# Patient Record
Sex: MALE | Race: WHITE | NOT HISPANIC OR LATINO | URBAN - METROPOLITAN AREA
[De-identification: names, ages, dates, MRNs, and addresses within clinical notes are randomized per-mention and may not be internally consistent; named-entity substitution may affect disease eponyms.]

---

## 2018-01-24 ENCOUNTER — TELEPHONE (OUTPATIENT)
Dept: NEUROLOGY | Facility: CLINIC | Age: 73
End: 2018-01-24

## 2018-01-24 NOTE — TELEPHONE ENCOUNTER
----- Message from Jose Alfredo Gray sent at 1/24/2018 11:26 AM CST -----  Contact: [facility] [phone]  [facility] is calling for refill on bd ultra-fine pen needle. Pls call

## 2019-05-20 ENCOUNTER — OFFICE VISIT (OUTPATIENT)
Dept: URGENT CARE | Facility: CLINIC | Age: 74
End: 2019-05-20
Payer: MEDICARE

## 2019-05-20 VITALS
HEIGHT: 71 IN | TEMPERATURE: 97 F | SYSTOLIC BLOOD PRESSURE: 180 MMHG | BODY MASS INDEX: 26.6 KG/M2 | HEART RATE: 55 BPM | RESPIRATION RATE: 18 BRPM | DIASTOLIC BLOOD PRESSURE: 82 MMHG | OXYGEN SATURATION: 97 % | WEIGHT: 190 LBS

## 2019-05-20 DIAGNOSIS — I10 HYPERTENSION, UNSPECIFIED TYPE: ICD-10-CM

## 2019-05-20 DIAGNOSIS — M25.511 ACUTE PAIN OF RIGHT SHOULDER: Primary | ICD-10-CM

## 2019-05-20 PROCEDURE — 73030 XR SHOULDER COMPLETE 2 OR MORE VIEWS RIGHT: ICD-10-PCS | Mod: FY,RT,S$GLB, | Performed by: RADIOLOGY

## 2019-05-20 PROCEDURE — 3079F PR MOST RECENT DIASTOLIC BLOOD PRESSURE 80-89 MM HG: ICD-10-PCS | Mod: CPTII,S$GLB,, | Performed by: FAMILY MEDICINE

## 2019-05-20 PROCEDURE — 3077F SYST BP >= 140 MM HG: CPT | Mod: CPTII,S$GLB,, | Performed by: FAMILY MEDICINE

## 2019-05-20 PROCEDURE — 99203 OFFICE O/P NEW LOW 30 MIN: CPT | Mod: S$GLB,,, | Performed by: FAMILY MEDICINE

## 2019-05-20 PROCEDURE — 3077F PR MOST RECENT SYSTOLIC BLOOD PRESSURE >= 140 MM HG: ICD-10-PCS | Mod: CPTII,S$GLB,, | Performed by: FAMILY MEDICINE

## 2019-05-20 PROCEDURE — 3079F DIAST BP 80-89 MM HG: CPT | Mod: CPTII,S$GLB,, | Performed by: FAMILY MEDICINE

## 2019-05-20 PROCEDURE — 73030 X-RAY EXAM OF SHOULDER: CPT | Mod: FY,RT,S$GLB, | Performed by: RADIOLOGY

## 2019-05-20 PROCEDURE — 99203 PR OFFICE/OUTPT VISIT, NEW, LEVL III, 30-44 MIN: ICD-10-PCS | Mod: S$GLB,,, | Performed by: FAMILY MEDICINE

## 2019-05-20 RX ORDER — AMLODIPINE BESYLATE 2.5 MG/1
2.5 TABLET ORAL DAILY
Qty: 30 TABLET | Refills: 1 | Status: SHIPPED | OUTPATIENT
Start: 2019-05-20

## 2019-05-20 RX ORDER — ATENOLOL 50 MG/1
50 TABLET ORAL DAILY
COMMUNITY

## 2019-05-20 NOTE — PATIENT INSTRUCTIONS
Shoulder Pain with Uncertain Cause  Shoulder pain can have many causes. Pain often comes from the structures that surround the shoulder joint. These are the joint capsule, ligaments, tendons, muscles, and bursa. Pain can also come from cartilage in the joint. Cartilage can become worn out or injured. Its important to know whats causing your pain so the healthcare provider can use the correct treatment. But sometimes its difficult to find the exact cause of shoulder pain. You may need to see a specialist (orthopedist). You may also need special tests such as a CT scan or MRI. The provider may need to use special tools to look inside the joint (arthroscopy).  Shoulder pain can be treated with a sling or a device that keeps your shoulder from moving. You can take an anti-inflammatory medicine such as ibuprofen to ease pain. You may need to do special shoulder exercises. Follow up with a specialist if the pain is severe or doesnt go away after a few weeks.  Home care  Follow these tips when caring for yourself at home:  · If a sling was given to you, leave it in place for the time advised by your healthcare provider. If you arent sure how long to wear it, ask for advice. If the sling becomes loose, adjust it so that your forearm is level with the ground. Your shoulder should feel well supported.  · Put an ice pack on the injured area for 20 minutes every 1 to 2 hours the first day. You can make your own ice pack by putting ice cubes in a plastic bag. Wrap the bag in a thin towel. Continue with ice packs 3 to 4 times a day for the next 2 days. Then use the pack as needed to ease pain and swelling.  · You may use acetaminophen or ibuprofen to control pain, unless another pain medicine was prescribed. If you have chronic liver or kidney disease, talk with your healthcare provider before using these medicines. Also talk with your provider if youve ever had a stomach ulcer or GI bleeding.  · Shoulder pain may seem  worse at night, when there is less to distract you from the pain. If you sleep on your side, try to keep weight off your painful shoulder. Propping pillows behind you may stop you from rolling over onto that shoulder during sleep.   · Shoulder and elbow joints can become stiff if left in a sling for too long. You should start range of motion exercises about 7 to 10 days after the injury. Talk with your provider to find out what type of exercises to do and how soon to start.  · You can take the sling off to shower or bathe.  Follow-up care  Follow up with your healthcare provider if you dont start to get better in the next 5 days.  When to seek medical advice  Call your healthcare provider right away if any of these occur:  · Pain or swelling gets worse or continues for more than a few days  · Your hand or fingers become cold, blue, numb, or tingly  · Large amount of bruising on your shoulder or upper arm  · Difficulty moving your hand or fingers  · Weakness in your hand or fingers  · Your shoulder becomes stiff  · It feels like your shoulder is popping out  · You are less able to do your daily activities  Date Last Reviewed: 10/1/2016  © 5035-2318 [employer]. [address] [place] [address], [place] [address]. All rights reserved. This information is not intended as a substitute for professional medical care. Always follow your healthcare professional's instructions.      BE SURE TO RECHECK WITH YOUR PRIMARY CARE PROVIDER IN ABOUT 2 WEEKS TO FOLLOW UP YOUR BLOOD PRESSURE (SINCE STARTING THIS NEW MEDICATION) AND SHOULDER PAIN.    Make sure that you follow up with your primary care doctor in the next 2-5 days if needed .  Return to the Urgent Care if signs or symptoms change and certainly if you have worsening symptoms go to the nearest emergency department for further evaluation.

## 2019-05-20 NOTE — PROGRESS NOTES
"Subjective:       Patient ID: Jm López is a 73 y.o. male.    Vitals:  height is 5' 10.5" (1.791 m) and weight is 86.2 kg (190 lb). His oral temperature is 97.2 °F (36.2 °C). His blood pressure is 180/82 (abnormal) and his pulse is 55 (abnormal). His respiration is 18 and oxygen saturation is 97%.     Chief Complaint: Fall    Patient sustained a fall yesterday morning coming out of [employer]. He was carrying some lumber and slipped on some leaves. He denies dizziness or lightheadedness. He fell directly on his right shoulder and was able to somewhat break his fall with his hands. He denies hitting his head and had no LOC. He denies use of blood thinners.  Long history of hypertension, with recently elevated readings.  His PCP is out of town.  Elevated BP readings noted recently in Oncology office. S/P right mastectomy for breast cancer.  No headaches.  No chest pain.    Fall   The accident occurred 12 to 24 hours ago. The fall occurred while walking. He landed on concrete. The point of impact was the right shoulder. The pain is present in the right upper arm. The pain is at a severity of 6/10. The pain is moderate. Associated symptoms include numbness and tingling. He has tried nothing for the symptoms.       Constitution: Negative.   HENT: Negative.    Neck: negative.   Cardiovascular: Negative.    Eyes: Negative.    Respiratory: Negative.    Gastrointestinal: Negative.    Endocrine: negative.   Genitourinary: Negative.    Musculoskeletal: Positive for pain and trauma.   Skin: Negative.    Neurological: Positive for numbness.       Objective:      Physical Exam   Constitutional: He is oriented to person, place, and time. He appears well-developed and well-nourished. No distress.   HENT:   Head: Normocephalic and atraumatic.   Right Ear: External ear normal.   Left Ear: External ear normal.   Mouth/Throat: No oropharyngeal exudate.   Eyes: Pupils are equal, round, and reactive to light. EOM are normal.   Neck: " Normal range of motion.   Cardiovascular: Normal rate, regular rhythm and normal heart sounds.   Pulmonary/Chest: Effort normal and breath sounds normal. No stridor. No respiratory distress. He has no wheezes.   Musculoskeletal: He exhibits no edema, tenderness or deformity.   No point tenderness elicited over right shoulder.  Full and painless range of motion noted of right shoulder.  Peripheral pulses present and equal.   Lymphadenopathy:     He has no cervical adenopathy.   Neurological: He is alert and oriented to person, place, and time. He displays normal reflexes. No cranial nerve deficit or sensory deficit. He exhibits normal muscle tone. Coordination normal.   Skin: He is not diaphoretic.       Assessment:       1. Acute pain of right shoulder    2. Hypertension, unspecified type        Plan:         Acute pain of right shoulder  -     XR SHOULDER COMPLETE 2 OR MORE VIEWS RIGHT; Future; Expected date: 05/20/2019    Hypertension, unspecified type  -     amLODIPine (NORVASC) 2.5 MG tablet; Take 1 tablet (2.5 mg total) by mouth once daily.  Dispense: 30 tablet; Refill: 1    BE SURE TO RECHECK WITH YOUR PRIMARY CARE PROVIDER IN ABOUT 2 WEEKS TO FOLLOW UP YOUR BLOOD PRESSURE (SINCE STARTING THIS NEW MEDICATION) AND SHOULDER PAIN.    Make sure that you follow up with your primary care doctor in the next 2-5 days if needed .  Return to the Urgent Care if signs or symptoms change and certainly if you have worsening symptoms go to the nearest emergency department for further evaluation.

## 2020-06-17 ENCOUNTER — LAB VISIT (OUTPATIENT)
Dept: LAB | Facility: OTHER | Age: 75
End: 2020-06-17
Attending: PODIATRIST
Payer: MEDICARE

## 2020-06-17 ENCOUNTER — OFFICE VISIT (OUTPATIENT)
Dept: PODIATRY | Facility: CLINIC | Age: 75
End: 2020-06-17
Payer: MEDICARE

## 2020-06-17 ENCOUNTER — OFFICE VISIT (OUTPATIENT)
Dept: URGENT CARE | Facility: CLINIC | Age: 75
End: 2020-06-17
Payer: MEDICARE

## 2020-06-17 VITALS
TEMPERATURE: 99 F | HEIGHT: 70 IN | OXYGEN SATURATION: 95 % | BODY MASS INDEX: 27.06 KG/M2 | WEIGHT: 189 LBS | RESPIRATION RATE: 18 BRPM | HEART RATE: 62 BPM

## 2020-06-17 VITALS
HEART RATE: 64 BPM | BODY MASS INDEX: 28.72 KG/M2 | SYSTOLIC BLOOD PRESSURE: 190 MMHG | WEIGHT: 200.63 LBS | DIASTOLIC BLOOD PRESSURE: 87 MMHG | HEIGHT: 70 IN

## 2020-06-17 DIAGNOSIS — S91.301A OPEN WOUND OF RIGHT FOOT, INITIAL ENCOUNTER: Primary | ICD-10-CM

## 2020-06-17 DIAGNOSIS — M10.00 ACUTE IDIOPATHIC GOUT, UNSPECIFIED SITE: ICD-10-CM

## 2020-06-17 DIAGNOSIS — M79.671 FOOT PAIN, RIGHT: Primary | ICD-10-CM

## 2020-06-17 DIAGNOSIS — M79.671 FOOT PAIN, RIGHT: ICD-10-CM

## 2020-06-17 LAB
ANION GAP SERPL CALC-SCNC: 7 MMOL/L (ref 8–16)
BASOPHILS # BLD AUTO: 0.04 K/UL (ref 0–0.2)
BASOPHILS NFR BLD: 0.5 % (ref 0–1.9)
BUN SERPL-MCNC: 21 MG/DL (ref 8–23)
CALCIUM SERPL-MCNC: 9.3 MG/DL (ref 8.7–10.5)
CHLORIDE SERPL-SCNC: 110 MMOL/L (ref 95–110)
CO2 SERPL-SCNC: 23 MMOL/L (ref 23–29)
CREAT SERPL-MCNC: 1.4 MG/DL (ref 0.5–1.4)
CRP SERPL-MCNC: 0.6 MG/L (ref 0–8.2)
DIFFERENTIAL METHOD: ABNORMAL
EOSINOPHIL # BLD AUTO: 0.3 K/UL (ref 0–0.5)
EOSINOPHIL NFR BLD: 3.9 % (ref 0–8)
ERYTHROCYTE [DISTWIDTH] IN BLOOD BY AUTOMATED COUNT: 13.7 % (ref 11.5–14.5)
ERYTHROCYTE [SEDIMENTATION RATE] IN BLOOD: 4 MM/HR (ref 0–10)
EST. GFR  (AFRICAN AMERICAN): 57 ML/MIN/1.73 M^2
EST. GFR  (NON AFRICAN AMERICAN): 49 ML/MIN/1.73 M^2
GLUCOSE SERPL-MCNC: 184 MG/DL (ref 70–110)
HCT VFR BLD AUTO: 41.5 % (ref 40–54)
HGB BLD-MCNC: 13.3 G/DL (ref 14–18)
IMM GRANULOCYTES # BLD AUTO: 0.02 K/UL (ref 0–0.04)
IMM GRANULOCYTES NFR BLD AUTO: 0.3 % (ref 0–0.5)
LYMPHOCYTES # BLD AUTO: 2.2 K/UL (ref 1–4.8)
LYMPHOCYTES NFR BLD: 28.5 % (ref 18–48)
MCH RBC QN AUTO: 28.8 PG (ref 27–31)
MCHC RBC AUTO-ENTMCNC: 32 G/DL (ref 32–36)
MCV RBC AUTO: 90 FL (ref 82–98)
MONOCYTES # BLD AUTO: 0.7 K/UL (ref 0.3–1)
MONOCYTES NFR BLD: 9.3 % (ref 4–15)
NEUTROPHILS # BLD AUTO: 4.4 K/UL (ref 1.8–7.7)
NEUTROPHILS NFR BLD: 57.5 % (ref 38–73)
NRBC BLD-RTO: 0 /100 WBC
PLATELET # BLD AUTO: 174 K/UL (ref 150–350)
PMV BLD AUTO: 10.4 FL (ref 9.2–12.9)
POTASSIUM SERPL-SCNC: 4.3 MMOL/L (ref 3.5–5.1)
RBC # BLD AUTO: 4.62 M/UL (ref 4.6–6.2)
SODIUM SERPL-SCNC: 140 MMOL/L (ref 136–145)
URATE SERPL-MCNC: 5 MG/DL (ref 3.4–7)
WBC # BLD AUTO: 7.65 K/UL (ref 3.9–12.7)

## 2020-06-17 PROCEDURE — 99204 PR OFFICE/OUTPT VISIT, NEW, LEVL IV, 45-59 MIN: ICD-10-PCS | Mod: S$GLB,,, | Performed by: NURSE PRACTITIONER

## 2020-06-17 PROCEDURE — 36415 COLL VENOUS BLD VENIPUNCTURE: CPT

## 2020-06-17 PROCEDURE — 99203 OFFICE O/P NEW LOW 30 MIN: CPT | Mod: S$GLB,,, | Performed by: PODIATRIST

## 2020-06-17 PROCEDURE — 99204 OFFICE O/P NEW MOD 45 MIN: CPT | Mod: S$GLB,,, | Performed by: NURSE PRACTITIONER

## 2020-06-17 PROCEDURE — 1101F PR PT FALLS ASSESS DOC 0-1 FALLS W/OUT INJ PAST YR: ICD-10-PCS | Mod: CPTII,S$GLB,, | Performed by: PODIATRIST

## 2020-06-17 PROCEDURE — 85025 COMPLETE CBC W/AUTO DIFF WBC: CPT

## 2020-06-17 PROCEDURE — 99203 PR OFFICE/OUTPT VISIT, NEW, LEVL III, 30-44 MIN: ICD-10-PCS | Mod: S$GLB,,, | Performed by: PODIATRIST

## 2020-06-17 PROCEDURE — 99999 PR PBB SHADOW E&M-EST. PATIENT-LVL III: CPT | Mod: PBBFAC,,, | Performed by: PODIATRIST

## 2020-06-17 PROCEDURE — 99999 PR PBB SHADOW E&M-EST. PATIENT-LVL III: ICD-10-PCS | Mod: PBBFAC,,, | Performed by: PODIATRIST

## 2020-06-17 PROCEDURE — 80048 BASIC METABOLIC PNL TOTAL CA: CPT

## 2020-06-17 PROCEDURE — 86140 C-REACTIVE PROTEIN: CPT

## 2020-06-17 PROCEDURE — 84550 ASSAY OF BLOOD/URIC ACID: CPT

## 2020-06-17 PROCEDURE — 85651 RBC SED RATE NONAUTOMATED: CPT

## 2020-06-17 PROCEDURE — 1159F MED LIST DOCD IN RCRD: CPT | Mod: S$GLB,,, | Performed by: PODIATRIST

## 2020-06-17 PROCEDURE — 3077F SYST BP >= 140 MM HG: CPT | Mod: CPTII,S$GLB,, | Performed by: PODIATRIST

## 2020-06-17 PROCEDURE — 1101F PT FALLS ASSESS-DOCD LE1/YR: CPT | Mod: CPTII,S$GLB,, | Performed by: PODIATRIST

## 2020-06-17 PROCEDURE — 3079F DIAST BP 80-89 MM HG: CPT | Mod: CPTII,S$GLB,, | Performed by: PODIATRIST

## 2020-06-17 PROCEDURE — 3079F PR MOST RECENT DIASTOLIC BLOOD PRESSURE 80-89 MM HG: ICD-10-PCS | Mod: CPTII,S$GLB,, | Performed by: PODIATRIST

## 2020-06-17 PROCEDURE — 1159F PR MEDICATION LIST DOCUMENTED IN MEDICAL RECORD: ICD-10-PCS | Mod: S$GLB,,, | Performed by: PODIATRIST

## 2020-06-17 PROCEDURE — 1126F PR PAIN SEVERITY QUANTIFIED, NO PAIN PRESENT: ICD-10-PCS | Mod: S$GLB,,, | Performed by: PODIATRIST

## 2020-06-17 PROCEDURE — 1126F AMNT PAIN NOTED NONE PRSNT: CPT | Mod: S$GLB,,, | Performed by: PODIATRIST

## 2020-06-17 PROCEDURE — 3077F PR MOST RECENT SYSTOLIC BLOOD PRESSURE >= 140 MM HG: ICD-10-PCS | Mod: CPTII,S$GLB,, | Performed by: PODIATRIST

## 2020-06-17 RX ORDER — COLCHICINE 0.6 MG/1
0.6 TABLET ORAL DAILY
Qty: 15 TABLET | Refills: 0 | Status: SHIPPED | OUTPATIENT
Start: 2020-06-17 | End: 2024-02-17

## 2020-06-17 NOTE — LETTER
June 17, 2020      Lily Gillespie, NP  [address] [facility] [address]           [place] [facility]  [address] [address]  Phone: [phone]  Fax: [phone]          Patient: Jm López   MR Number: [id number]   YOB: 1945   Date of Visit: 6/17/2020       Dear Lily Gillespie:    Thank you for referring Jm López to me for evaluation. Attached you will find relevant portions of my assessment and plan of care.    If you have questions, please do not hesitate to call me. I look forward to following Jm López along with you.    Sincerely,    Dale Small, BEV    Enclosure  CC:  No Recipients    If you would like to receive this communication electronically, please contact [email or web address] or [phone] to request more information on [employer] access.    For providers and/or their staff who would like to refer a patient to [facility], please contact us through our one-stop-shop provider referral line, [facility], at [phone].    If you feel you have received this communication in error or would no longer like to receive these types of communications, please e-mail [email or web address]

## 2020-06-17 NOTE — PATIENT INSTRUCTIONS
General Referral to [facility]  You were referred to [facility] for Follow-up Care.    Please call [phone] to schedule your appointment.    Please go to the Emergency Department for any concerns or worsening of condition.  Please follow up with your primary care doctor or specialist in the next 48-72hrs as needed.    If you  smoke, please stop smoking.

## 2020-06-17 NOTE — PROGRESS NOTES
"Subjective:       Patient ID: Jm López is a 75 y.o. male.    Vitals:  height is 5' 10" (1.778 m) and weight is 85.7 kg (189 lb). His temperature is 99.4 °F (37.4 °C). His pulse is 62. His respiration is 18 and oxygen saturation is 95%.     Chief Complaint: Wound Infection    Patient presents with c.o sore to rt foot. Patient woke up around 1 am. Patient thought he was bitten by a bug. The area is becoming more red and painful. Patient is a diabetic. No fever.    Wound Check  Treated in ED: no ER treatment. The redness has worsened. The swelling has worsened. The pain has worsened.       Constitution: Negative for chills and fever.   HENT: Negative for facial swelling and sore throat.    Neck: Negative for painful lymph nodes.   Eyes: Negative for eye itching and eyelid swelling.   Respiratory: Negative for cough.    Musculoskeletal: Negative for joint pain and joint swelling.   Skin: Positive for wound. Negative for color change, pale, rash, abrasion, laceration, lesion, skin thickening/induration, puncture wound, erythema, abscess, avulsion and hives.   Allergic/Immunologic: Negative for environmental allergies, immunocompromised state and hives.   Hematologic/Lymphatic: Negative for swollen lymph nodes.       Objective:      Physical Exam   HENT:   Head: Normocephalic and atraumatic.   Eyes: Pupils are equal, round, and reactive to light. Conjunctivae are normal.   Cardiovascular: Normal rate, regular rhythm, normal heart sounds and normal pulses.   Pulmonary/Chest: Effort normal and breath sounds normal.   Abdominal: Normal appearance.   Musculoskeletal:      Right foot: Decreased range of motion. Normal capillary refill. Tenderness and swelling present. No bony tenderness, crepitus, deformity or laceration.        Feet:    Neurological: He is alert.   Skin: not right footerythema  Nursing note and vitals reviewed.            Assessment:       1. Open wound of right foot, initial encounter        Plan:     "     Open wound of right foot, initial encounter  -     Cancel: Ambulatory referral/consult to Wound Clinic  -     Ambulatory referral/consult to Podiatry      Patient Instructions     General Referral to [facility]  You were referred to [facility] for Follow-up Care.    Please call [phone] to schedule your appointment.    Please go to the Emergency Department for any concerns or worsening of condition.  Please follow up with your primary care doctor or specialist in the next 48-72hrs as needed.    If you  smoke, please stop smoking.

## 2020-06-17 NOTE — PROGRESS NOTES
Subjective:      Patient ID: Jm López is a 75 y.o. male.    Chief Complaint: Foot Problem (Rightb foot)    Intense pain, redness, heat, swelling, and small wound right big toe joint.  Rapid onset overnight waking from same about 1am this morning with no change since. aggravated by increased weight bearing, shoe gear, pressure.  No previous medical treatment-  evaluation yielded visit here.  No self treatment.  Relates minor trauma bumping right big toe joint on something yesterday.  Denies surgery right foot.  Relates prior mrsa infection right knee following contusion.    Review of Systems   Constitution: Negative for chills, diaphoresis, fever, malaise/fatigue and night sweats.   Cardiovascular: Negative for claudication, cyanosis, leg swelling and syncope.   Skin: Positive for suspicious lesions. Negative for color change, dry skin, nail changes, rash and unusual hair distribution.   Musculoskeletal: Positive for joint pain and joint swelling. Negative for falls, muscle cramps, muscle weakness and stiffness.   Gastrointestinal: Negative for constipation, diarrhea, nausea and vomiting.   Neurological: Negative for brief paralysis, disturbances in coordination, focal weakness, numbness, paresthesias, sensory change and tremors.           Objective:      Physical Exam  Constitutional:       General: He is not in acute distress.     Appearance: He is well-developed. He is not diaphoretic.   Cardiovascular:      Pulses:           Popliteal pulses are 2+ on the right side and 2+ on the left side.        Dorsalis pedis pulses are 2+ on the right side and 2+ on the left side.        Posterior tibial pulses are 2+ on the right side and 2+ on the left side.      Comments: Capillary refill 3 seconds all toes/distal feet, all toes/both feet warm to touch.      Negative lymphadenopathy bilateral popliteal fossa and tarsal tunnel.      Negavie lower extremity edema bilateral.    Musculoskeletal:      Right ankle: He  exhibits normal range of motion, no swelling, no ecchymosis, no deformity, no laceration and normal pulse. Achilles tendon normal. Achilles tendon exhibits no pain, no defect and normal Powers's test results.      Comments: Intense pain, redness, heat, swelling right big toe joint with touch, palpation, range of motion, and pressure/stance without deformity, loss of function beyond guarding, or local signs acute trauma.    Otherwise, Normal angle, base, station of gait. All ten toes without clubbing, cyanosis, or signs of ischemia.  No pain to palpation bilateral lower extremities.  Range of motion, stability, muscle strength, and muscle tone normal bilateral feet and legs.    Lymphadenopathy:      Lower Body: No right inguinal adenopathy. No left inguinal adenopathy.      Comments: Negative lymphadenopathy bilateral popliteal fossa and tarsal tunnel.    Negative lymphangitic streaking bilateral feet/ankles/legs.   Skin:     General: Skin is warm and dry.      Capillary Refill: Capillary refill takes 2 to 3 seconds.      Coloration: Skin is not pale.      Findings: Erythema (mild right 1st mtpj) present. No abrasion, bruising, burn, ecchymosis, laceration, lesion or rash.      Nails: There is no clubbing.        Comments:    Wound:  Medial right 1st mtpj    Size:    Pre:8x8x<1mm      Base:  Granular, pink with no drainage - nothing to culture.  No pus, tracking, fluctuance, malodor.    Borders:   flat, pink, blanchable skin edges without undermining.    Otherwise, Skin is normal age and health appropriate color, turgor, texture, and temperature bilateral lower extremities without ulceration, hyperpigmentation, discoloration, masses nodules or cords palpated.  No ecchymosis, erythema, edema, or cardinal signs of infection bilateral lower extremities.    Neurological:      Mental Status: He is alert and oriented to person, place, and time.      Sensory: No sensory deficit.      Motor: No tremor, atrophy or abnormal  muscle tone.      Gait: Gait normal.      Deep Tendon Reflexes:      Reflex Scores:       Patellar reflexes are 2+ on the right side and 2+ on the left side.       Achilles reflexes are 2+ on the right side and 2+ on the left side.     Comments: Negative tinel sign to percussion sural, superficial peroneal, deep peroneal, saphenous, and posterior tibial nerves right and left ankles and feet.     Psychiatric:         Behavior: Behavior is cooperative.               Assessment:       Encounter Diagnoses   Name Primary?    Foot pain, right Yes    Acute idiopathic gout, unspecified site          Plan:       Jm was seen today for foot problem.    Diagnoses and all orders for this visit:    Foot pain, right  -     X-Ray Foot Complete Right; Future  -     Basic metabolic panel; Future  -     CBC auto differential; Future  -     C-Reactive Protein; Future  -     Sedimentation rate; Future  -     Uric acid; Future    Acute idiopathic gout, unspecified site  -     X-Ray Foot Complete Right; Future  -     Basic metabolic panel; Future  -     CBC auto differential; Future  -     C-Reactive Protein; Future  -     Sedimentation rate; Future  -     Uric acid; Future    Other orders  -     colchicine (COLCRYS) 0.6 mg tablet; Take 1 tablet (0.6 mg total) by mouth once daily.      I counseled the patient on his conditions, their implications and medical management.        Imaging, labs, colchicine.    Hydrate well 4L water daily    Dispense sx shoe righ - ambulate to tolerance weaning to shoes as symptoms allow.    Inspect feet multiple times daily for signs of occurrence/recurrence ulceration/infection.    Cover superficial would right 1st mtpj with band aid dressing daily changing same daily.  Thin layer triple antibiotic ointment beneath daily.            Follow up in about 2 weeks (around 7/1/2020) for gout f/u.

## 2021-01-10 ENCOUNTER — IMMUNIZATION (OUTPATIENT)
Dept: INTERNAL MEDICINE | Facility: CLINIC | Age: 76
End: 2021-01-10
Payer: MEDICARE

## 2021-01-10 DIAGNOSIS — Z23 NEED FOR VACCINATION: ICD-10-CM

## 2021-01-10 PROCEDURE — 91300 COVID-19, MRNA, LNP-S, PF, 30 MCG/0.3 ML DOSE VACCINE: CPT | Mod: PBBFAC | Performed by: INTERNAL MEDICINE

## 2021-01-31 ENCOUNTER — IMMUNIZATION (OUTPATIENT)
Dept: INTERNAL MEDICINE | Facility: CLINIC | Age: 76
End: 2021-01-31
Payer: MEDICARE

## 2021-01-31 DIAGNOSIS — Z23 NEED FOR VACCINATION: Primary | ICD-10-CM

## 2021-01-31 PROCEDURE — 91300 PR SARS-COV- 2 COVID-19 VACCINE, NO PRSV, 30MCG/0.3ML, IM: CPT | Mod: PBBFAC | Performed by: INTERNAL MEDICINE

## 2021-01-31 PROCEDURE — 0002A PR IMMUNIZ ADMIN, SARS-COV-2 COVID-19 VACC, 30MCG/0.3ML, 2ND DOSE: CPT | Mod: PBBFAC | Performed by: INTERNAL MEDICINE

## 2021-01-31 RX ADMIN — RNA INGREDIENT BNT-162B2 0.3 ML: 0.23 INJECTION, SUSPENSION INTRAMUSCULAR at 09:01

## 2021-03-18 ENCOUNTER — PATIENT MESSAGE (OUTPATIENT)
Dept: RESEARCH | Facility: HOSPITAL | Age: 76
End: 2021-03-18

## 2021-03-26 ENCOUNTER — PATIENT MESSAGE (OUTPATIENT)
Dept: RESEARCH | Facility: HOSPITAL | Age: 76
End: 2021-03-26

## 2024-02-17 ENCOUNTER — OFFICE VISIT (OUTPATIENT)
Dept: URGENT CARE | Facility: CLINIC | Age: 79
End: 2024-02-17
Payer: MEDICARE

## 2024-02-17 VITALS
TEMPERATURE: 98 F | RESPIRATION RATE: 19 BRPM | WEIGHT: 185 LBS | DIASTOLIC BLOOD PRESSURE: 85 MMHG | SYSTOLIC BLOOD PRESSURE: 155 MMHG | OXYGEN SATURATION: 97 % | HEIGHT: 70 IN | BODY MASS INDEX: 26.48 KG/M2 | HEART RATE: 78 BPM
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
## 2024-02-17 DIAGNOSIS — Z23 NEED FOR TDAP VACCINATION: ICD-10-CM

## 2024-02-17 DIAGNOSIS — S61.051A DOG BITE OF RIGHT THUMB, INITIAL ENCOUNTER: Primary | ICD-10-CM

## 2024-02-17 PROBLEM — R53.83 FATIGUE: Status: ACTIVE | Noted: 2022-12-13

## 2024-02-17 PROBLEM — G47.00 INSOMNIA: Status: ACTIVE | Noted: 2022-12-13

## 2024-02-17 PROBLEM — D38.1 NEOPLASM OF UNCERTAIN BEHAVIOR OF TRACHEA, BRONCHUS, AND LUNG: Status: ACTIVE | Noted: 2022-07-28

## 2024-02-17 PROBLEM — C50.919: Status: ACTIVE | Noted: 2023-12-18

## 2024-02-17 PROBLEM — J90 RECURRENT PLEURAL EFFUSION ON RIGHT: Status: ACTIVE | Noted: 2022-08-10

## 2024-02-17 PROBLEM — C50.021: Status: ACTIVE | Noted: 2018-06-19

## 2024-02-17 PROBLEM — N40.0 BENIGN PROSTATIC HYPERPLASIA: Status: ACTIVE | Noted: 2022-06-20

## 2024-02-17 PROBLEM — M54.50 ACUTE MIDLINE LOW BACK PAIN WITHOUT SCIATICA: Status: ACTIVE | Noted: 2022-11-01

## 2024-02-17 PROBLEM — E55.9 VITAMIN D DEFICIENCY: Status: ACTIVE | Noted: 2018-07-16

## 2024-02-17 PROBLEM — R00.2 PALPITATIONS: Status: ACTIVE | Noted: 2022-12-13

## 2024-02-17 PROBLEM — N18.2 CKD STAGE G2/A1, GFR 60-89 AND ALBUMIN CREATININE RATIO <30 MG/G: Chronic | Status: ACTIVE | Noted: 2023-10-29

## 2024-02-17 PROBLEM — C78.2: Status: ACTIVE | Noted: 2023-12-18

## 2024-02-17 PROBLEM — E11.9 DIABETES MELLITUS: Status: ACTIVE | Noted: 2023-06-28

## 2024-02-17 PROCEDURE — 99203 OFFICE O/P NEW LOW 30 MIN: CPT | Mod: 25,S$GLB,, | Performed by: NURSE PRACTITIONER

## 2024-02-17 PROCEDURE — 90715 TDAP VACCINE 7 YRS/> IM: CPT | Mod: S$GLB,,, | Performed by: NURSE PRACTITIONER

## 2024-02-17 PROCEDURE — 90471 IMMUNIZATION ADMIN: CPT | Mod: S$GLB,,, | Performed by: NURSE PRACTITIONER

## 2024-02-17 RX ORDER — EMPAGLIFLOZIN 10 MG/1
10 TABLET, FILM COATED ORAL
COMMUNITY

## 2024-02-17 RX ORDER — MUPIROCIN 20 MG/G
OINTMENT TOPICAL 3 TIMES DAILY PRN
Qty: 22 G | Refills: 0 | Status: SHIPPED | OUTPATIENT
Start: 2024-02-17

## 2024-02-17 RX ORDER — METRONIDAZOLE 500 MG/1
500 TABLET ORAL EVERY 8 HOURS
Qty: 9 TABLET | Refills: 0 | Status: SHIPPED | OUTPATIENT
Start: 2024-02-17 | End: 2024-02-20

## 2024-02-17 RX ORDER — INSULIN DEGLUDEC 200 U/ML
12-15 INJECTION, SOLUTION SUBCUTANEOUS
COMMUNITY
Start: 2023-10-20 | End: 2024-10-19

## 2024-02-17 RX ORDER — DOXYCYCLINE 100 MG/1
100 CAPSULE ORAL EVERY 12 HOURS
Qty: 6 CAPSULE | Refills: 0 | Status: SHIPPED | OUTPATIENT
Start: 2024-02-17 | End: 2024-02-20

## 2024-02-17 NOTE — PATIENT INSTRUCTIONS
If you got a prescription for antibiotics, be sure to take all of the medicine as prescribed.  Use a thin layer of antibiotic ointment to help keep the wound moist. This will also keep the dressing from sticking to the wound.  After a day or two, you can gently wash the wound with soap and water. Pat dry and put on a clean dressing.   Change your dressing three times a day.  Always wash your hands before and after touching the wound.  Each time you change the dressing, look closely at the wound to be sure it is healing the right way. The wound may have a thin, yellowish discharge, and this is normal.  Avoid picking the scab or scratching the site which may cause more irritation.  Do not soak in water or swim with an open wound.  Please return here or go to the Emergency Department for any concerns or worsening of condition.  You were prescribed antibiotics, please take them to completion.  Please follow up with your primary care doctor or specialist as needed.    If you  smoke, please stop smoking.

## 2024-02-17 NOTE — PROGRESS NOTES
"Subjective:      Patient ID: Jm López is a 78 y.o. male.    Vitals:  height is 5' 10" (1.778 m) and weight is 83.9 kg (185 lb). His oral temperature is 97.7 °F (36.5 °C). His blood pressure is 155/85 (abnormal) and his pulse is 78. His respiration is 19 and oxygen saturation is 97%.     Chief Complaint: Animal Bite    Patient presents with dog bite on right thumb. Patient received a rescue dog. Unsure of last tetanus. Onset 1 day      78 year old male presents to clinic with reports of dog bite right thumb one day ago. Does not recall last tetanus vaccine.     Animal Bite   The incident occurred yesterday. The incident occurred at home. There is an injury to the Right thumb. The pain is mild. His tetanus status is unknown.       Constitution: Negative for chills and fever.   Skin:  Positive for laceration and puncture wound. Negative for color change and erythema.      Objective:     Physical Exam   Constitutional: He is oriented to person, place, and time. He appears well-developed.   HENT:   Head: Normocephalic and atraumatic. Head is without abrasion, without contusion and without laceration.   Ears:   Right Ear: External ear normal.   Left Ear: External ear normal.   Nose: Nose normal.   Mouth/Throat: Oropharynx is clear and moist and mucous membranes are normal.   Eyes: EOM and lids are normal.   Neck: Trachea normal and phonation normal. Neck supple.   Cardiovascular: Normal rate.   Pulmonary/Chest: Effort normal. No stridor. No respiratory distress.   Musculoskeletal: Normal range of motion.         General: Normal range of motion.      Right hand: He exhibits laceration. Right thumb: Injuries: puncture wound.   Neurological: He is alert and oriented to person, place, and time.   Skin: Skin is warm, dry, intact and no rash. Capillary refill takes less than 2 seconds. Abrasions - upper ext.:  hand (right)  Lacerations - upper ext.:  right handNo abrasion, No burn, No bruising, No erythema and No " ecchymosis   Psychiatric: His speech is normal and behavior is normal. Judgment and thought content normal.   Nursing note and vitals reviewed.    Superficial laceration  Assessment:     1. Dog bite of right thumb, initial encounter    2. Need for Tdap vaccination        Plan:       Dog bite of right thumb, initial encounter  -     (In Office Administered) Tdap Vaccine  -     doxycycline (VIBRAMYCIN) 100 MG Cap; Take 1 capsule (100 mg total) by mouth every 12 (twelve) hours. for 3 days  Dispense: 6 capsule; Refill: 0  -     metroNIDAZOLE (FLAGYL) 500 MG tablet; Take 1 tablet (500 mg total) by mouth every 8 (eight) hours. for 3 days  Dispense: 9 tablet; Refill: 0  -     mupirocin (BACTROBAN) 2 % ointment; Apply topically 3 (three) times daily as needed.  Dispense: 22 g; Refill: 0    Need for Tdap vaccination  -     (In Office Administered) Tdap Vaccine      Patient Instructions   If you got a prescription for antibiotics, be sure to take all of the medicine as prescribed.  Use a thin layer of antibiotic ointment to help keep the wound moist. This will also keep the dressing from sticking to the wound.  After a day or two, you can gently wash the wound with soap and water. Pat dry and put on a clean dressing.   Change your dressing three times a day.  Always wash your hands before and after touching the wound.  Each time you change the dressing, look closely at the wound to be sure it is healing the right way. The wound may have a thin, yellowish discharge, and this is normal.  Avoid picking the scab or scratching the site which may cause more irritation.  Do not soak in water or swim with an open wound.  Please return here or go to the Emergency Department for any concerns or worsening of condition.  You were prescribed antibiotics, please take them to completion.  Please follow up with your primary care doctor or specialist as needed.    If you  smoke, please stop smoking.